# Patient Record
Sex: MALE | NOT HISPANIC OR LATINO | URBAN - METROPOLITAN AREA
[De-identification: names, ages, dates, MRNs, and addresses within clinical notes are randomized per-mention and may not be internally consistent; named-entity substitution may affect disease eponyms.]

---

## 2017-06-07 ENCOUNTER — IMPORTED ENCOUNTER (OUTPATIENT)
Dept: URBAN - METROPOLITAN AREA CLINIC 1 | Facility: CLINIC | Age: 67
End: 2017-06-07

## 2017-06-07 PROBLEM — H52.4: Noted: 2017-06-07

## 2017-06-07 PROCEDURE — S0621 ROUTINE OPHTHALMOLOGICAL EXA: HCPCS

## 2017-06-07 NOTE — PATIENT DISCUSSION
1.  Presbyopia: Rx was given for corrective spectacles if indicated. 2.  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 Advised PF ATs Q1H OU. Will place BCL OD. **Patient is being followed by Dr Kelvin Elliott for [facility]. Return for an appointment in 1 year for 40. with Dr. Jena Anderson. Return for an appointment in tomorrow 10 (cornea check no charge exam) with Dr. Jena Anderson.

## 2017-06-08 ENCOUNTER — IMPORTED ENCOUNTER (OUTPATIENT)
Dept: URBAN - METROPOLITAN AREA CLINIC 1 | Facility: CLINIC | Age: 67
End: 2017-06-08

## 2017-06-08 PROBLEM — H16.143: Noted: 2017-06-08

## 2017-06-08 PROBLEM — H04.123: Noted: 2017-06-08

## 2017-06-08 PROBLEM — H18.51: Noted: 2017-06-08

## 2017-06-08 PROBLEM — T26.10XA: Noted: 2017-06-08
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
## 2017-06-12 ENCOUNTER — IMPORTED ENCOUNTER (OUTPATIENT)
Dept: URBAN - METROPOLITAN AREA CLINIC 1 | Facility: CLINIC | Age: 67
End: 2017-06-12

## 2017-06-12 PROCEDURE — 99213 OFFICE O/P EST LOW 20 MIN: CPT
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
## 2018-01-17 NOTE — PATIENT DISCUSSION
GAVE NEW GLS RX TODAY TO PT, TO GET GLS MADE TO WEAR WHILE HAVING TO BE OUT OF CONTACTS 2 WEEKS PRIOR TO COMING IN FOR PREOP.

## 2018-01-30 NOTE — PATIENT DISCUSSION
Surgery Counseling: I have discussed the option of scheduling surgery versus following, as well as the risks, benefits and alternatives of cataract surgery with the patient. It was explained that the surgery is medically indicated at this time, and it can be performed at the patient's option as delaying will cause no further deterioration, therefore there is no rush and there is no harm in waiting to have surgery. It was also explained that there is no guarantee that removing the cataract will improve their vision. The patient understands and desires to proceed with cataract surgery with the implantation of an intraocular lens to improve vision for __DRIVING_____________. I have given the patient the prescribed regimen of the all-in-one drop to use before and after cataract surgery. They have elected to use the all-in-one option of Pred/Gati/Brom(prednisolone acetate,gatifloxacin,and bromfenac. Patient to administer as directed.

## 2018-01-30 NOTE — PATIENT DISCUSSION
CATARACTS, OU - VISUALLY SIGNIFICANT. SCHEDULE _OS_ FIRST THEN LATER IN _OD_ DISCUSSED OPTION OF ____TORIC OU___________VS ______STD OU____________. PATIENT UNDERSTANDS AND DESIRES ____TO Xander Butt _____.

## 2018-03-05 NOTE — PATIENT DISCUSSION
Pre-Op 2nd Eye Counseling: The patient has noticed an improvement in their visual symptoms in the operative eye. The patient complains of decreased vision in the fellow eye when ___DRIVING____________________. It was explained to the patient that the decision to proceed with cataract surgery in the fellow eye is entirely a separate decision from the surgical eye. All of the same risks, benefits and alternatives are reviewed with the patient again. The patient does feel the vision in the non-operative eye is limiting their daily activities and elects to proceed with cataract surgery in the __RIGHT_____ eye. . I have given the patient the prescribed regimen of  drops to use before and after cataract surgery. Patient to administer as directed.

## 2018-03-05 NOTE — PATIENT DISCUSSION
S/P PE IOL, __OS_. DOING WELL. CONTINUE PRED-GATI-BROM IN THE SURGICAL EYE  FOR A TOTAL OF 3 WEEKS USE THEN DISCONTINUE. SCHEDULE 2ND EYE CATARACT SURGERY.

## 2018-03-14 NOTE — PATIENT DISCUSSION
New Prescription: erythromycin (erythromycin): ointment: 5 mg/gram (0.5 %) a small amount at bedtime into right eye 03-

## 2018-03-14 NOTE — PATIENT DISCUSSION
Post-Op Instructions: The patient was instructed to continue the proper use of post-operative eye drop: pred-moxi-ketor in the surgical eye, 3 times per day for the remainder of the 3 weeks schedule, then discontinue. Call back instructions, retinal detachment and endophthalmitis precautions given.

## 2018-03-16 NOTE — PATIENT DISCUSSION
PUNCTATE KERATITIS, OD. PT EDUCATION. RX EMYCIN GENEVA QHS. CAN USE UP TO QID. MONITOR. RTO AS SCHEDULED.

## 2018-03-16 NOTE — PATIENT DISCUSSION
Continue: erythromycin (erythromycin): ointment: 5 mg/gram (0.5 %) a small amount at bedtime into right eye 03-

## 2018-03-21 NOTE — PATIENT DISCUSSION
Continue: Durezol (difluprednate): drops: 0.05% 1 drop three times a day as directed into affected eye

## 2018-03-21 NOTE — PATIENT DISCUSSION
Continue: Ilevro (nepafenac): drops,suspension: 0.3% 1 drop once a day as directed into affected eye

## 2021-06-16 ENCOUNTER — IMPORTED ENCOUNTER (OUTPATIENT)
Dept: URBAN - METROPOLITAN AREA CLINIC 1 | Facility: CLINIC | Age: 71
End: 2021-06-16

## 2021-06-16 PROBLEM — H52.223: Noted: 2021-06-16

## 2021-06-16 PROBLEM — H52.4: Noted: 2021-06-16

## 2021-06-16 PROBLEM — H44.23: Noted: 2021-06-16

## 2021-06-16 PROCEDURE — S0620 ROUTINE OPHTHALMOLOGICAL EXA: HCPCS

## 2021-06-16 NOTE — PATIENT DISCUSSION
1. Myopia w/ Astigmatism OU -- Rx was given for correction if indicated and requested. 2. Presbyopia 3. Fuchs' Dystrophy OU -- Observe 4. INDIA w/ PEK OU -- Cont ATs TID OU routinely. 5.  Dermatochlasis OU UL's -- Non-surgical at this time continue to monitor for progression. 6.  Pseudophakia OU -- Doing well. 7. S/p RD Repair (Dr. Debbie Carson; 10 years ago)Return for an appointment in 6 months 27 with Dr. Libby Hood. Return for an appointment in 1 year 36 with Dr. Libby Hood.

## 2022-04-02 ASSESSMENT — TONOMETRY
OD_IOP_MMHG: 16
OS_IOP_MMHG: 17
OS_IOP_MMHG: 17
OD_IOP_MMHG: 17

## 2022-04-02 ASSESSMENT — VISUAL ACUITY
OS_CC: J1
OS_CC: J1+
OS_SC: 20/20
OD_CC: J1+
OD_SC: 20/20
OD_SC: 20/30
OD_CC: J1
OS_SC: 20/15-1
OS_SC: 20/20
OD_SC: 20/15
OS_SC: 20/25
OD_SC: 20/20

## 2024-06-19 ENCOUNTER — NEW PATIENT (OUTPATIENT)
Dept: URBAN - METROPOLITAN AREA CLINIC 1 | Facility: CLINIC | Age: 74
End: 2024-06-19

## 2024-06-19 DIAGNOSIS — H52.4: ICD-10-CM

## 2024-06-19 DIAGNOSIS — H52.223: ICD-10-CM

## 2024-06-19 DIAGNOSIS — H52.13: ICD-10-CM

## 2024-06-19 DIAGNOSIS — Z01.00: ICD-10-CM

## 2024-06-19 PROCEDURE — 92004 COMPRE OPH EXAM NEW PT 1/>: CPT

## 2024-06-19 PROCEDURE — 92015 DETERMINE REFRACTIVE STATE: CPT

## 2024-06-19 ASSESSMENT — VISUAL ACUITY
OD_CC: 20/20
OS_CC: 20/25+2

## 2024-06-19 ASSESSMENT — TONOMETRY
OS_IOP_MMHG: 16
OD_IOP_MMHG: 15